# Patient Record
Sex: FEMALE | Race: WHITE
[De-identification: names, ages, dates, MRNs, and addresses within clinical notes are randomized per-mention and may not be internally consistent; named-entity substitution may affect disease eponyms.]

---

## 2019-07-01 ENCOUNTER — HOSPITAL ENCOUNTER (OUTPATIENT)
Dept: HOSPITAL 92 - LABBT | Age: 59
Discharge: HOME | End: 2019-07-01
Attending: ORTHOPAEDIC SURGERY
Payer: COMMERCIAL

## 2019-07-01 DIAGNOSIS — S83.207A: ICD-10-CM

## 2019-07-01 DIAGNOSIS — Z01.818: Primary | ICD-10-CM

## 2019-07-01 LAB
ANION GAP SERPL CALC-SCNC: 12 MMOL/L (ref 10–20)
BASOPHILS # BLD AUTO: 0 THOU/UL (ref 0–0.2)
BASOPHILS NFR BLD AUTO: 0.9 % (ref 0–1)
BUN SERPL-MCNC: 11 MG/DL (ref 9.8–20.1)
CALCIUM SERPL-MCNC: 9.8 MG/DL (ref 7.8–10.44)
CHLORIDE SERPL-SCNC: 105 MMOL/L (ref 98–107)
CO2 SERPL-SCNC: 26 MMOL/L (ref 23–31)
CREAT CL PREDICTED SERPL C-G-VRATE: 0 ML/MIN (ref 70–130)
EOSINOPHIL # BLD AUTO: 0.1 THOU/UL (ref 0–0.7)
EOSINOPHIL NFR BLD AUTO: 2.6 % (ref 0–10)
GLUCOSE SERPL-MCNC: 91 MG/DL (ref 80–115)
HGB BLD-MCNC: 15.2 G/DL (ref 12–16)
LYMPHOCYTES # BLD: 1.6 THOU/UL (ref 1.2–3.4)
LYMPHOCYTES NFR BLD AUTO: 37.9 % (ref 21–51)
MCH RBC QN AUTO: 27.5 PG (ref 27–31)
MCV RBC AUTO: 86.7 FL (ref 78–98)
MONOCYTES # BLD AUTO: 0.3 THOU/UL (ref 0.11–0.59)
MONOCYTES NFR BLD AUTO: 8.4 % (ref 0–10)
NEUTROPHILS # BLD AUTO: 2.1 THOU/UL (ref 1.4–6.5)
NEUTROPHILS NFR BLD AUTO: 50.2 % (ref 42–75)
PLATELET # BLD AUTO: 263 THOU/UL (ref 130–400)
POTASSIUM SERPL-SCNC: 4.5 MMOL/L (ref 3.5–5.1)
RBC # BLD AUTO: 5.55 MILL/UL (ref 4.2–5.4)
SODIUM SERPL-SCNC: 138 MMOL/L (ref 136–145)
WBC # BLD AUTO: 4.1 THOU/UL (ref 4.8–10.8)

## 2019-07-01 PROCEDURE — 93005 ELECTROCARDIOGRAM TRACING: CPT

## 2019-07-01 PROCEDURE — 80048 BASIC METABOLIC PNL TOTAL CA: CPT

## 2019-07-01 PROCEDURE — 93010 ELECTROCARDIOGRAM REPORT: CPT

## 2019-07-01 PROCEDURE — 85025 COMPLETE CBC W/AUTO DIFF WBC: CPT

## 2019-07-09 NOTE — HP
HISTORY OF PRESENT ILLNESS:  The patient is a 59-year-old female with approximately

6-month history of pain in her left knee, which is worse with ambulation and

weightbearing.  She has had no specific injury.  She was seen by Dr. Roman at

[facility] and given a cortisone injection in January, which initially gave

good relief, but she has had recurrent symptoms, which is worse with certain

motions, and it persisted despite rest, restriction of activities. 



PAST MEDICAL HISTORY:  The patient is otherwise in good health.  She has history of

hypertension. 



CURRENT MEDICATIONS:  Include amlodipine.



ALLERGIES:  SHE HAS NO KNOWN ALLERGIES.



FAMILY HISTORY:  Otherwise, unremarkable.



SOCIAL HISTORY:  Otherwise, unremarkable.



REVIEW OF SYSTEMS:  Otherwise, unremarkable.



PHYSICAL EXAMINATION:

GENERAL:  Reveals healthy female. 

HEENT:  Unremarkable. 

NECK:  Supple. 

CHEST:  Clear. 

HEART:  Regular rate and rhythm. 

ABDOMEN:  Soft, nontender. 

PELVIC, RECTAL, BREASTS:  Deferred. 

EXTREMITIES:  Pertinent findings were related to the left knee.  There is puffiness.

 No definite effusion.  There is a questionable Baker cyst.  There is normal

alignment.  There is tenderness over the medial joint line.  Range of motion is 0 to

135 degrees.  There is no instability.  There is slight left antalgic gait. 

NEUROVASCULAR:  Intact.



DIAGNOSTIC STUDIES:  X-rays of the left knee reveal minimal degenerative changes and

slight medial joint space narrowing.  MRI scan of the left knee reveals a joint

effusion, a popliteal cyst, and a complex tear of the medial meniscus. 



IMPRESSION:  Internal derangement of left knee with medial meniscal tear with

possible component of degenerative joint disease. 



PLAN:  Arthroscopy of left knee with partial medial meniscectomy and/or debridement

and shaving.  The nature of the surgery, length of recovery, and potential

complications such as infection, loss of motion, incomplete relief, thromboembolic

phenomenon, neurovascular injury, posttraumatic degenerative arthritis, recurrent

tear, need for additional treatment, and repeat surgery have been discussed in

detail. 







Job ID:  [id number]

## 2019-07-10 ENCOUNTER — HOSPITAL ENCOUNTER (OUTPATIENT)
Dept: HOSPITAL 92 - SDC | Age: 59
Discharge: HOME | End: 2019-07-10
Attending: ORTHOPAEDIC SURGERY
Payer: COMMERCIAL

## 2019-07-10 VITALS — BODY MASS INDEX: 29.9 KG/M2

## 2019-07-10 VITALS — SYSTOLIC BLOOD PRESSURE: 132 MMHG | DIASTOLIC BLOOD PRESSURE: 86 MMHG

## 2019-07-10 DIAGNOSIS — F41.9: ICD-10-CM

## 2019-07-10 DIAGNOSIS — M22.42: ICD-10-CM

## 2019-07-10 DIAGNOSIS — I10: ICD-10-CM

## 2019-07-10 DIAGNOSIS — S83.232A: Primary | ICD-10-CM

## 2019-07-10 DIAGNOSIS — Z99.89: ICD-10-CM

## 2019-07-10 DIAGNOSIS — Z79.899: ICD-10-CM

## 2019-07-10 DIAGNOSIS — G47.30: ICD-10-CM

## 2019-07-10 PROCEDURE — 0SBD4ZZ EXCISION OF LEFT KNEE JOINT, PERCUTANEOUS ENDOSCOPIC APPROACH: ICD-10-PCS | Performed by: ORTHOPAEDIC SURGERY

## 2019-07-10 NOTE — OP
DATE OF PROCEDURE:  07/10/2019



PREOPERATIVE DIAGNOSIS:  Medial meniscal tear and early degenerative joint disease,

left knee. 



POSTOPERATIVE DIAGNOSIS:  Medial meniscal tear and early degenerative joint disease,

left knee. 



PROCEDURE PERFORMED:  Arthroscopy of left knee with partial medial meniscectomy.



ANESTHESIA:  General.



OPERATIVE FINDINGS:  Examination of anesthesia revealed the knee to be stable.  On

arthroscopy, there was mild effusion.  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

patella, but no areas needed shaving or debridement.  Examination of the medial

compartment revealed grade 2 and early grade 3 areas weightbearing surface of the

medial femoral condyle and a small area of grade 3 area in the medial tibial

plateau.  There was a flap tear of the posterior horn of the medial meniscus and

mild degeneration within the medial meniscus.  ACL was intact.  Lateral meniscus and

lateral compartment were normal. 



DESCRIPTION OF PROCEDURE:  After satisfactory anesthesia was induced in the supine

position, the patient was placed in a leg stock and then prepped and draped in

routine sterile fashion.  The left leg was elevated and exsanguinated with an

Esmarch bandage and the tourniquet was inflated to 300 mmHg.  [place] arthroscope

was introduced through the anterolateral portal, probed through the anteromedial

portal and inflow and outflow accomplished through the scope using a [employer]

arthroscopy pump.  Arthroscopy was carried out and the above findings were noted.

All findings were documented with video printer and hard copies were made.  The

posterior horn and flap tear of the medial meniscus were debrided with a motorized

shaver and the rim balanced and probed and found to be stable.  There was still an

intact rim of at least 5 mm.  A small amount of the tibial plateau and the medial

femoral condyle were debrided with a motorized shaver.  The scope was then

introduced into the anterior medial portal and all compartments visualized.  No

additional pathology found.  The knee was then copiously irrigated through the scope

and all instruments were then withdrawn.  20 mL of 0.5% Marcaine with epinephrine

was instilled into the knee joint and additional 10 mL injected about the portal

sites.  Portal sites were closed with 3-0 nylon and a sterile bulky compressive

dressing was applied and the tourniquet deflated after 14 minutes.  The foot

promptly pinked up and the patient was awakened and taken to recovery in stable

condition.  There were no apparent intraoperative complications.  The estimated

blood loss was negligible. 



The patient will be discharged home in satisfactory condition, instructed to ice,

elevation, use of crutches, home exercise program by the physical Therapy

Department.  She was given a prescription for Norco 5 for pain, 24 tablets.  She

will be rechecked in my office in 7 to 10 days or sooner if there are any problems

prior to that time. 







Job ID:  [id number]

## 2020-08-21 ENCOUNTER — HOSPITAL ENCOUNTER (OUTPATIENT)
Dept: HOSPITAL 92 - LABBT | Age: 60
Discharge: HOME | End: 2020-08-21
Attending: ORTHOPAEDIC SURGERY
Payer: COMMERCIAL

## 2020-08-21 DIAGNOSIS — Z01.818: Primary | ICD-10-CM

## 2020-08-21 DIAGNOSIS — M23.91: ICD-10-CM

## 2020-08-21 DIAGNOSIS — Z20.828: ICD-10-CM

## 2020-08-21 LAB
ANION GAP SERPL CALC-SCNC: 13 MMOL/L (ref 10–20)
BASOPHILS # BLD AUTO: 0 THOU/UL (ref 0–0.2)
BASOPHILS NFR BLD AUTO: 0.3 % (ref 0–1)
BUN SERPL-MCNC: 12 MG/DL (ref 9.8–20.1)
CALCIUM SERPL-MCNC: 9.5 MG/DL (ref 7.8–10.44)
CHLORIDE SERPL-SCNC: 103 MMOL/L (ref 98–107)
CO2 SERPL-SCNC: 24 MMOL/L (ref 23–31)
CREAT CL PREDICTED SERPL C-G-VRATE: 0 ML/MIN (ref 70–130)
EOSINOPHIL # BLD AUTO: 0.1 THOU/UL (ref 0–0.7)
EOSINOPHIL NFR BLD AUTO: 1.9 % (ref 0–10)
GLUCOSE SERPL-MCNC: 93 MG/DL (ref 80–115)
HGB BLD-MCNC: 15.9 G/DL (ref 12–16)
LYMPHOCYTES # BLD: 1.7 THOU/UL (ref 1.2–3.4)
LYMPHOCYTES NFR BLD AUTO: 36.9 % (ref 21–51)
MCH RBC QN AUTO: 28.7 PG (ref 27–31)
MCV RBC AUTO: 87.5 FL (ref 78–98)
MONOCYTES # BLD AUTO: 0.3 THOU/UL (ref 0.11–0.59)
MONOCYTES NFR BLD AUTO: 7.1 % (ref 0–10)
NEUTROPHILS # BLD AUTO: 2.4 THOU/UL (ref 1.4–6.5)
NEUTROPHILS NFR BLD AUTO: 53.8 % (ref 42–75)
PLATELET # BLD AUTO: 266 THOU/UL (ref 130–400)
POTASSIUM SERPL-SCNC: 4.1 MMOL/L (ref 3.5–5.1)
RBC # BLD AUTO: 5.54 MILL/UL (ref 4.2–5.4)
SODIUM SERPL-SCNC: 136 MMOL/L (ref 136–145)
WBC # BLD AUTO: 4.5 THOU/UL (ref 4.8–10.8)

## 2020-08-21 PROCEDURE — 93010 ELECTROCARDIOGRAM REPORT: CPT

## 2020-08-21 PROCEDURE — 85025 COMPLETE CBC W/AUTO DIFF WBC: CPT

## 2020-08-21 PROCEDURE — 87635 SARS-COV-2 COVID-19 AMP PRB: CPT

## 2020-08-21 PROCEDURE — 93005 ELECTROCARDIOGRAM TRACING: CPT

## 2020-08-21 PROCEDURE — 80048 BASIC METABOLIC PNL TOTAL CA: CPT

## 2020-08-21 PROCEDURE — U0003 INFECTIOUS AGENT DETECTION BY NUCLEIC ACID (DNA OR RNA); SEVERE ACUTE RESPIRATORY SYNDROME CORONAVIRUS 2 (SARS-COV-2) (CORONAVIRUS DISEASE [COVID-19]), AMPLIFIED PROBE TECHNIQUE, MAKING USE OF HIGH THROUGHPUT TECHNOLOGIES AS DESCRIBED BY CMS-2020-01-R: HCPCS

## 2020-08-21 NOTE — EKG
Test Reason : 

Blood Pressure : ***/*** mmHG

Vent. Rate : 072 BPM     Atrial Rate : 072 BPM

   P-R Int : 176 ms          QRS Dur : 086 ms

    QT Int : 402 ms       P-R-T Axes : 049 056 050 degrees

   QTc Int : 440 ms

 

Normal sinus rhythm

Normal ECG

No previous ECGs available

Confirmed by DR. JESUS NOLEN ([id number]) on 8/21/2020 4:47:07 PM

 

Referred By:  JENNIFER           Confirmed By:DR. JESUS NOLEN

## 2020-08-25 NOTE — HP
HISTORY OF PRESENT ILLNESS:  The patient is a 60-year-old female with approximately

6-week history of pain in her right knee without specific injury.  She has pain,

popping and catching sensation which has persisted despite restriction of

activities, use of ibuprofen.  Her symptoms are very similar to symptoms she had in

her left knee prior to arthroscopic meniscectomy 1 year ago. 



PAST MEDICAL HISTORY:  The patient is, otherwise, in good health.  She has history

of hypertension. 



CURRENT MEDICATIONS:  Include amlodipine, she has been using ibuprofen for current

symptoms. 



PAST SURGICAL HISTORY:  She has had a previous cholecystectomy.



ALLERGIES:  SHE HAS NO KNOWN ALLERGIES.



FAMILY HISTORY:  Otherwise, unremarkable.



SOCIAL HISTORY:  Otherwise, unremarkable.



REVIEW OF SYSTEMS:  Otherwise, unremarkable.



PHYSICAL EXAMINATION:

GENERAL:  Reveals a healthy female. 

HEENT:  Unremarkable. 

NECK:  Supple. 

CHEST:  Clear. 

HEART:  Regular rate and rhythm. 

ABDOMEN:  Soft and nontender. 

PELVIC:  Deferred. 

RECTAL:  Deferred. 

BREASTS:  Deferred. 

EXTREMITIES:  Pertinent findings with the right knee, there is no effusion, there is

normal alignment, there is tenderness over the medial joint line.  Range of motion

is 0 to 125 degrees.  There is pain with Kvng maneuver.  There is no

instability. 

NEUROVASCULAR:  Intact.



DIAGNOSTIC STUDIES:  X-rays of the knee are essentially normal except perhaps

minimal degenerative changes.  MRI scan of the right knee reveals probable

degenerative medial meniscal tear and some degenerative changes. 



IMPRESSION:  Internal derangement of right knee with probable medial meniscal tear,

possible component of degenerative joint disease. 



PLAN:  Arthroscopy of right knee with partial medial meniscectomy and/or debridement

and shaving.  The nature of the surgery, length of recovery, and potential

complications such as infection, loss of motion, incomplete relief, thromboembolic

phenomenon, neurovascular injury, posttraumatic degenerative arthritis, recurrent

tear, need for additional treatment, or repeat surgery have been discussed in

detail. 







Job ID:  [id number]

## 2020-08-26 ENCOUNTER — HOSPITAL ENCOUNTER (OUTPATIENT)
Dept: HOSPITAL 92 - SDC | Age: 60
Discharge: HOME | End: 2020-08-26
Attending: ORTHOPAEDIC SURGERY
Payer: COMMERCIAL

## 2020-08-26 VITALS — BODY MASS INDEX: 31.1 KG/M2

## 2020-08-26 DIAGNOSIS — S83.241A: Primary | ICD-10-CM

## 2020-08-26 DIAGNOSIS — I10: ICD-10-CM

## 2020-08-26 DIAGNOSIS — Z79.899: ICD-10-CM

## 2020-08-26 DIAGNOSIS — M17.11: ICD-10-CM

## 2020-08-26 PROCEDURE — 0SBC4ZZ EXCISION OF RIGHT KNEE JOINT, PERCUTANEOUS ENDOSCOPIC APPROACH: ICD-10-PCS | Performed by: ORTHOPAEDIC SURGERY

## 2020-08-26 PROCEDURE — S0028 INJECTION, FAMOTIDINE, 20 MG: HCPCS

## 2020-08-26 NOTE — OP
DATE OF PROCEDURE:  08/26/2020



ANESTHESIA:  General.



PREOPERATIVE DIAGNOSES:  

1. Medial meniscal tear.

2. Degenerative joint disease, right knee.



POSTOPERATIVE DIAGNOSES:  

1. Medial meniscal tear.

2. Degenerative joint disease, right knee.



PROCEDURE PERFORMED:  Arthroscopy of the right knee with partial medial meniscectomy.



OPERATIVE FINDINGS:  Examination under anesthesia revealed the knee to be stable.

On arthroscopy, there was a moderate effusion and mild synovitis.  There was grade 1

and very early grade 2 chondromalacia of the patella.  No areas need any shaving.

There was a complex degenerative type tear of the posterior horn of the medial

meniscus with a flap component.  There was grade 2 and early grade 3 changes of the

weightbearing surface of the medial femoral condyle and tibia.  There may have been

some small punctate areas of almost down to bone on the tibia, but no major areas of

exposed bone.  ACL was intact.  Lateral compartment was essentially normal.  There

was some mild fraying of the free border of the lateral meniscus, but the bulk of

meniscus was intact. 



DESCRIPTION OF PROCEDURE:  After satisfactory anesthesia was induced in supine

position, the patient was placed in a leg stock and then prepped and draped in

routine manner.  The right leg was elevated and exsanguinated with an Esmarch

bandage and the tourniquet inflated to 300 mmHg.  [place] arthroscope was introduced

in the anterolateral portal, put through the anteromedial portal.  The inflow and

outflow accomplished through the scope using a [employer] arthroscopy pump.

Arthroscopy was carried out and the above findings were noted.  All findings were

documented with video printer and hard copies were made.  Posterior horn of the

medial meniscus was debrided with use of basket forceps and motorized shaver.  The

posterior horn was debrided and saucerized and remaining rim probed and found to be

stable.  There was still an intact rim of 4 to 5 mm.  The scope was then introduced

into the anteromedial portal and all compartments visualized.  No additional

pathology found.  The knee was copiously irrigated through the scope and all

instruments were then withdrawn.  20 mL of 0.5% Marcaine with epinephrine was

instilled into the knee joint and additional 10 mL injected about the portal sites.

The portal sites were closed with 3-0 nylon and a sterile bulky compressive dressing

was applied.  The tourniquet deflated after 18 minutes.  Foot promptly pinked up.

The patient was awakened and taken to recovery room in stable condition.  There were

no apparent intraoperative complications.  The estimated blood loss was negligible. 



The patient will be discharged home in satisfactory condition, instructed on ice and

elevation and given written wound care instructions.  She has Norco 5 mg at home for

pain.  She was instructed on home program of exercise by the Physical Therapy

department, use of crutches.  She will be rechecked in my office in 10 to 14 days or

sooner if there are any problems prior to that time. 







Job ID:  [id number]